# Patient Record
Sex: MALE
[De-identification: names, ages, dates, MRNs, and addresses within clinical notes are randomized per-mention and may not be internally consistent; named-entity substitution may affect disease eponyms.]

---

## 2020-03-11 ENCOUNTER — TRANSCRIPTION ENCOUNTER (OUTPATIENT)
Age: 35
End: 2020-03-11

## 2021-02-08 PROBLEM — Z00.00 ENCOUNTER FOR PREVENTIVE HEALTH EXAMINATION: Status: ACTIVE | Noted: 2021-02-08

## 2021-02-09 ENCOUNTER — APPOINTMENT (OUTPATIENT)
Dept: CARDIOLOGY | Facility: CLINIC | Age: 36
End: 2021-02-09

## 2024-08-17 ENCOUNTER — EMERGENCY (EMERGENCY)
Facility: HOSPITAL | Age: 39
LOS: 1 days | Discharge: HOME | End: 2024-08-17
Attending: EMERGENCY MEDICINE | Admitting: EMERGENCY MEDICINE
Payer: OTHER MISCELLANEOUS

## 2024-08-17 VITALS
SYSTOLIC BLOOD PRESSURE: 143 MMHG | HEART RATE: 76 BPM | DIASTOLIC BLOOD PRESSURE: 85 MMHG | OXYGEN SATURATION: 95 % | TEMPERATURE: 98 F | RESPIRATION RATE: 16 BRPM

## 2024-08-17 VITALS
DIASTOLIC BLOOD PRESSURE: 104 MMHG | SYSTOLIC BLOOD PRESSURE: 161 MMHG | HEART RATE: 93 BPM | RESPIRATION RATE: 18 BRPM | WEIGHT: 210.1 LBS | TEMPERATURE: 98 F | OXYGEN SATURATION: 98 %

## 2024-08-17 LAB
APPEARANCE UR: CLEAR — SIGNIFICANT CHANGE UP
BILIRUB UR-MCNC: NEGATIVE — SIGNIFICANT CHANGE UP
COLOR SPEC: YELLOW — SIGNIFICANT CHANGE UP
DIFF PNL FLD: NEGATIVE — SIGNIFICANT CHANGE UP
GLUCOSE UR QL: NEGATIVE MG/DL — SIGNIFICANT CHANGE UP
KETONES UR-MCNC: NEGATIVE MG/DL — SIGNIFICANT CHANGE UP
LEUKOCYTE ESTERASE UR-ACNC: NEGATIVE — SIGNIFICANT CHANGE UP
NITRITE UR-MCNC: NEGATIVE — SIGNIFICANT CHANGE UP
PH UR: 6.5 — SIGNIFICANT CHANGE UP (ref 5–8)
PROT UR-MCNC: NEGATIVE MG/DL — SIGNIFICANT CHANGE UP
SP GR SPEC: 1.02 — SIGNIFICANT CHANGE UP (ref 1–1.03)
UROBILINOGEN FLD QL: 1 MG/DL — SIGNIFICANT CHANGE UP (ref 0.2–1)

## 2024-08-17 PROCEDURE — 73030 X-RAY EXAM OF SHOULDER: CPT | Mod: 26,LT

## 2024-08-17 PROCEDURE — 99284 EMERGENCY DEPT VISIT MOD MDM: CPT

## 2024-08-17 RX ORDER — LIDOCAINE 5% 5 G/100G
1 CREAM TOPICAL
Qty: 20 | Refills: 0
Start: 2024-08-17 | End: 2024-09-05

## 2024-08-17 RX ORDER — KETOROLAC TROMETHAMINE 10 MG
30 TABLET ORAL ONCE
Refills: 0 | Status: DISCONTINUED | OUTPATIENT
Start: 2024-08-17 | End: 2024-08-17

## 2024-08-17 RX ORDER — CYCLOBENZAPRINE HCL 10 MG
1 TABLET ORAL
Qty: 10 | Refills: 0
Start: 2024-08-17 | End: 2024-08-26

## 2024-08-17 RX ORDER — CYCLOBENZAPRINE HCL 10 MG
10 TABLET ORAL ONCE
Refills: 0 | Status: COMPLETED | OUTPATIENT
Start: 2024-08-17 | End: 2024-08-17

## 2024-08-17 RX ORDER — KETOROLAC TROMETHAMINE 10 MG
1 TABLET ORAL
Qty: 15 | Refills: 0
Start: 2024-08-17 | End: 2024-08-21

## 2024-08-17 RX ORDER — LIDOCAINE 5% 5 G/100G
1 CREAM TOPICAL ONCE
Refills: 0 | Status: COMPLETED | OUTPATIENT
Start: 2024-08-17 | End: 2024-08-17

## 2024-08-17 RX ADMIN — Medication 10 MILLIGRAM(S): at 15:41

## 2024-08-17 RX ADMIN — Medication 30 MILLIGRAM(S): at 16:43

## 2024-08-17 RX ADMIN — LIDOCAINE 5% 1 PATCH: 5 CREAM TOPICAL at 15:41

## 2024-08-17 NOTE — ED PROVIDER NOTE - CLINICAL SUMMARY MEDICAL DECISION MAKING FREE TEXT BOX
Range of motion intact in left shoulder.  Patient is able to extend left arm above 90 degrees so unlikely right rotator cuff tear.  X-ray did not show fractures or dislocation.  Patient is neurovascularly intact distal to injury.  It is likely just a sprain but given prior history of labrum tear, it could be an occurrence due to trauma.  I instructed patient to follow-up with Ortho and get an MRI if not improved in 1 to 2 weeks.  I warned patient about frozen shoulder.  He expressed understanding of my instructions.  Patient to return to work on Monday and desk duty for 1 week.

## 2024-08-17 NOTE — ED PROVIDER NOTE - PHYSICAL EXAMINATION
*  *  Normocephalic, atraumatic  EOMI, pupils equal  No epistaxis, no septal hematoma  No JVD, no nuchal rigidity, no c-spine tenderness  Lungs clear, normal effort, no chest wall crepitus or tenderness  RRR, normal S1 and S2, no murmur  Back normal on inspection, no midline tenderness  ROM intact in all limbs, no gross deformities, tender left shoulder joint, distal extremities warm/well perfused  GCS 15, normal speech, no focal motor deficits    Normal mood and affect
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
## 2024-08-17 NOTE — ED PROVIDER NOTE - PATIENT PORTAL LINK FT
You can access the FollowMyHealth Patient Portal offered by [facility] by registering at the following website: [email or web address]. By joining [employer]’s FollowMyHealth portal, you will also be able to view your health information using other applications (apps) compatible with our system.
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
## 2024-08-17 NOTE — ED PROVIDER NOTE - NSFOLLOWUPINSTRUCTIONS_ED_ALL_ED_FT
Please do not use the sling for more than a week.  Gently range your shoulder at least twice a day to prevent frozen shoulder.    If you continue to have pain after 1 week, please follow-up with Dr. Gates and get MRI to rule out labrum tear.    Please take ketorolac as needed with food and water.  Do not take no more than 5 days.  Do not take with other NSAIDs such as Aleve, Motrin, ibuprofen, naproxen.    Please take Flexeril as needed at night.  It is a muscle relaxer and can cause drowsiness.  Use caution.    PLEASE APPLY LIDOCAINE PATCH TO AREA OF PAIN. USE ONE AT A TIME. PLEASE USE FOR 12 HOURS AND THEN LEAVE OFF FOR 12 HOURS BEFORE APPLYING NEW.
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
## 2024-08-19 DIAGNOSIS — Z98.890 OTHER SPECIFIED POSTPROCEDURAL STATES: Chronic | ICD-10-CM
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
## 2024-08-21 DIAGNOSIS — M25.512 PAIN IN LEFT SHOULDER: ICD-10-CM

## 2024-08-21 DIAGNOSIS — S43.402A UNSPECIFIED SPRAIN OF LEFT SHOULDER JOINT, INITIAL ENCOUNTER: ICD-10-CM
